# Patient Record
Sex: FEMALE | Race: WHITE | Employment: OTHER | URBAN - METROPOLITAN AREA
[De-identification: names, ages, dates, MRNs, and addresses within clinical notes are randomized per-mention and may not be internally consistent; named-entity substitution may affect disease eponyms.]

---

## 2019-05-19 ENCOUNTER — HOSPITAL ENCOUNTER (OUTPATIENT)
Facility: HOSPITAL | Age: 84
Setting detail: OBSERVATION
LOS: 1 days | Discharge: NURSING, REHAB OR LONG-TERM CARE | End: 2019-05-21
Attending: EMERGENCY MEDICINE | Admitting: INTERNAL MEDICINE
Payer: MEDICARE

## 2019-05-19 DIAGNOSIS — L03.116 CELLULITIS OF LEFT LOWER EXTREMITY: ICD-10-CM

## 2019-05-19 DIAGNOSIS — S82.402A TIBIA/FIBULA FRACTURE, LEFT, CLOSED, INITIAL ENCOUNTER: Primary | ICD-10-CM

## 2019-05-19 DIAGNOSIS — S82.202A TIBIA/FIBULA FRACTURE, LEFT, CLOSED, INITIAL ENCOUNTER: Primary | ICD-10-CM

## 2019-05-19 PROCEDURE — 96365 THER/PROPH/DIAG IV INF INIT: CPT

## 2019-05-19 PROCEDURE — 99285 EMERGENCY DEPT VISIT HI MDM: CPT

## 2019-05-19 PROCEDURE — 29515 APPLICATION SHORT LEG SPLINT: CPT

## 2019-05-19 RX ORDER — SENNA AND DOCUSATE SODIUM 50; 8.6 MG/1; MG/1
1 TABLET, FILM COATED ORAL 2 TIMES DAILY
COMMUNITY

## 2019-05-19 RX ORDER — MINERAL OIL AND PETROLATUM 150; 830 MG/G; MG/G
1 OINTMENT OPHTHALMIC AS NEEDED
COMMUNITY

## 2019-05-19 RX ORDER — ERYTHROMYCIN 5 MG/G
1 OINTMENT OPHTHALMIC 2 TIMES DAILY
COMMUNITY

## 2019-05-19 RX ORDER — OCTISALATE, AVOBENZONE, HOMOSALATE, AND OCTOCRYLENE 29.4; 29.4; 49; 25.48 MG/ML; MG/ML; MG/ML; MG/ML
4 LOTION TOPICAL
COMMUNITY
Start: 2019-05-17 | End: 2019-06-01

## 2019-05-20 ENCOUNTER — APPOINTMENT (OUTPATIENT)
Dept: GENERAL RADIOLOGY | Facility: HOSPITAL | Age: 84
End: 2019-05-20
Attending: EMERGENCY MEDICINE
Payer: MEDICARE

## 2019-05-20 ENCOUNTER — APPOINTMENT (OUTPATIENT)
Dept: ULTRASOUND IMAGING | Facility: HOSPITAL | Age: 84
End: 2019-05-20
Attending: EMERGENCY MEDICINE
Payer: MEDICARE

## 2019-05-20 PROBLEM — L03.116 CELLULITIS OF LEFT LOWER EXTREMITY: Status: ACTIVE | Noted: 2019-05-20

## 2019-05-20 PROBLEM — S82.202A TIBIA/FIBULA FRACTURE, LEFT, CLOSED, INITIAL ENCOUNTER: Status: ACTIVE | Noted: 2019-05-20

## 2019-05-20 PROBLEM — S82.402A TIBIA/FIBULA FRACTURE, LEFT, CLOSED, INITIAL ENCOUNTER: Status: ACTIVE | Noted: 2019-05-20

## 2019-05-20 PROCEDURE — 85610 PROTHROMBIN TIME: CPT | Performed by: EMERGENCY MEDICINE

## 2019-05-20 PROCEDURE — 96367 TX/PROPH/DG ADDL SEQ IV INF: CPT

## 2019-05-20 PROCEDURE — 84132 ASSAY OF SERUM POTASSIUM: CPT | Performed by: SPECIALIST

## 2019-05-20 PROCEDURE — 85730 THROMBOPLASTIN TIME PARTIAL: CPT | Performed by: EMERGENCY MEDICINE

## 2019-05-20 PROCEDURE — 96376 TX/PRO/DX INJ SAME DRUG ADON: CPT

## 2019-05-20 PROCEDURE — 73630 X-RAY EXAM OF FOOT: CPT | Performed by: EMERGENCY MEDICINE

## 2019-05-20 PROCEDURE — 85025 COMPLETE CBC W/AUTO DIFF WBC: CPT | Performed by: EMERGENCY MEDICINE

## 2019-05-20 PROCEDURE — 96366 THER/PROPH/DIAG IV INF ADDON: CPT

## 2019-05-20 PROCEDURE — 92610 EVALUATE SWALLOWING FUNCTION: CPT

## 2019-05-20 PROCEDURE — 80048 BASIC METABOLIC PNL TOTAL CA: CPT | Performed by: EMERGENCY MEDICINE

## 2019-05-20 PROCEDURE — 73590 X-RAY EXAM OF LOWER LEG: CPT | Performed by: EMERGENCY MEDICINE

## 2019-05-20 PROCEDURE — 93971 EXTREMITY STUDY: CPT | Performed by: EMERGENCY MEDICINE

## 2019-05-20 PROCEDURE — 96372 THER/PROPH/DIAG INJ SC/IM: CPT

## 2019-05-20 RX ORDER — AMLODIPINE BESYLATE 5 MG/1
5 TABLET ORAL NIGHTLY
Status: DISCONTINUED | OUTPATIENT
Start: 2019-05-20 | End: 2019-05-21

## 2019-05-20 RX ORDER — DONEPEZIL HYDROCHLORIDE 10 MG/1
10 TABLET, FILM COATED ORAL NIGHTLY
Status: DISCONTINUED | OUTPATIENT
Start: 2019-05-20 | End: 2019-05-21

## 2019-05-20 RX ORDER — SODIUM CHLORIDE 9 MG/ML
INJECTION, SOLUTION INTRAVENOUS CONTINUOUS
Status: DISCONTINUED | OUTPATIENT
Start: 2019-05-20 | End: 2019-05-20

## 2019-05-20 RX ORDER — ACETAMINOPHEN 325 MG/1
325 TABLET ORAL EVERY 6 HOURS PRN
Status: DISCONTINUED | OUTPATIENT
Start: 2019-05-20 | End: 2019-05-21

## 2019-05-20 RX ORDER — POLYETHYLENE GLYCOL 3350 17 G/17G
17 POWDER, FOR SOLUTION ORAL
Status: DISCONTINUED | OUTPATIENT
Start: 2019-05-20 | End: 2019-05-21

## 2019-05-20 RX ORDER — MIRTAZAPINE 15 MG/1
30 TABLET, FILM COATED ORAL NIGHTLY
Status: DISCONTINUED | OUTPATIENT
Start: 2019-05-20 | End: 2019-05-21

## 2019-05-20 RX ORDER — BISACODYL 10 MG
10 SUPPOSITORY, RECTAL RECTAL DAILY PRN
COMMUNITY

## 2019-05-20 RX ORDER — ERYTHROMYCIN 5 MG/G
1 OINTMENT OPHTHALMIC 2 TIMES DAILY
Status: DISCONTINUED | OUTPATIENT
Start: 2019-05-20 | End: 2019-05-21

## 2019-05-20 RX ORDER — POTASSIUM CHLORIDE 20 MEQ/1
40 TABLET, EXTENDED RELEASE ORAL EVERY 4 HOURS
Status: DISCONTINUED | OUTPATIENT
Start: 2019-05-20 | End: 2019-05-20

## 2019-05-20 RX ORDER — MINERAL OIL AND PETROLATUM 150; 830 MG/G; MG/G
1 OINTMENT OPHTHALMIC AS NEEDED
Status: DISCONTINUED | OUTPATIENT
Start: 2019-05-20 | End: 2019-05-21

## 2019-05-20 RX ORDER — SODIUM CHLORIDE 9 MG/ML
INJECTION, SOLUTION INTRAVENOUS CONTINUOUS
Status: DISCONTINUED | OUTPATIENT
Start: 2019-05-20 | End: 2019-05-21

## 2019-05-20 RX ORDER — GARLIC EXTRACT 500 MG
1 CAPSULE ORAL DAILY
Status: DISCONTINUED | OUTPATIENT
Start: 2019-05-20 | End: 2019-05-21

## 2019-05-20 RX ORDER — POTASSIUM CHLORIDE 20 MEQ/1
20 TABLET, EXTENDED RELEASE ORAL 2 TIMES DAILY
Status: DISCONTINUED | OUTPATIENT
Start: 2019-05-20 | End: 2019-05-21

## 2019-05-20 RX ORDER — SODIUM CHLORIDE 9 MG/ML
INJECTION, SOLUTION INTRAVENOUS CONTINUOUS
Status: ACTIVE | OUTPATIENT
Start: 2019-05-20 | End: 2019-05-20

## 2019-05-20 RX ORDER — LEVOTHYROXINE SODIUM 0.05 MG/1
50 TABLET ORAL
Status: DISCONTINUED | OUTPATIENT
Start: 2019-05-20 | End: 2019-05-21

## 2019-05-20 RX ORDER — SENNA AND DOCUSATE SODIUM 50; 8.6 MG/1; MG/1
1 TABLET, FILM COATED ORAL 2 TIMES DAILY
Status: DISCONTINUED | OUTPATIENT
Start: 2019-05-20 | End: 2019-05-21

## 2019-05-20 RX ORDER — ENOXAPARIN SODIUM 100 MG/ML
40 INJECTION SUBCUTANEOUS DAILY
Status: DISCONTINUED | OUTPATIENT
Start: 2019-05-20 | End: 2019-05-21

## 2019-05-20 RX ORDER — ATORVASTATIN CALCIUM 10 MG/1
10 TABLET, FILM COATED ORAL
Status: DISCONTINUED | OUTPATIENT
Start: 2019-05-20 | End: 2019-05-21

## 2019-05-20 RX ORDER — DOCUSATE SODIUM 100 MG/1
100 CAPSULE, LIQUID FILLED ORAL 2 TIMES DAILY PRN
Status: DISCONTINUED | OUTPATIENT
Start: 2019-05-20 | End: 2019-05-21

## 2019-05-20 RX ORDER — HYDROCODONE BITARTRATE AND ACETAMINOPHEN 5; 325 MG/1; MG/1
1 TABLET ORAL EVERY 6 HOURS PRN
Status: DISCONTINUED | OUTPATIENT
Start: 2019-05-20 | End: 2019-05-21

## 2019-05-20 RX ORDER — AMLODIPINE BESYLATE 2.5 MG/1
2.5 TABLET ORAL EVERY MORNING
Status: DISCONTINUED | OUTPATIENT
Start: 2019-05-20 | End: 2019-05-21

## 2019-05-20 RX ORDER — BISACODYL 10 MG
10 SUPPOSITORY, RECTAL RECTAL DAILY PRN
Status: DISCONTINUED | OUTPATIENT
Start: 2019-05-20 | End: 2019-05-21

## 2019-05-20 RX ORDER — MAGNESIUM OXIDE 400 MG (241.3 MG MAGNESIUM) TABLET
400 TABLET DAILY
Status: DISCONTINUED | OUTPATIENT
Start: 2019-05-20 | End: 2019-05-21

## 2019-05-20 RX ORDER — ASPIRIN 81 MG/1
81 TABLET, CHEWABLE ORAL DAILY
Status: DISCONTINUED | OUTPATIENT
Start: 2019-05-20 | End: 2019-05-21

## 2019-05-20 RX ORDER — MULTIPLE VITAMINS W/ MINERALS TAB 9MG-400MCG
1 TAB ORAL DAILY
Status: DISCONTINUED | OUTPATIENT
Start: 2019-05-20 | End: 2019-05-21

## 2019-05-20 NOTE — OCCUPATIONAL THERAPY NOTE
OCCUPATIONAL THERAPY                   OT evaluation order received. Patient is awaiting ortho consult for tib/fib fracture. Will hold OT eval until medical plan determined.

## 2019-05-20 NOTE — PLAN OF CARE
Received via stretcher from ER. Unable to discuss plan of care due to cognitive impairment. [facility] transfer form evaluated. Obtained all PTA medications from that form. Dr. Monserrat Sorenson notified of admission. New orders received.  IV infusing in right wri

## 2019-05-20 NOTE — PLAN OF CARE
Problem: Impaired Swallowing  Goal: Minimize aspiration risk  Description  Interventions:  - Patient should be alert and upright for all feedings (90 degrees preferred)  - Offer food and liquids at a slow rate  - No straws  - Encourage small bites of lucian

## 2019-05-20 NOTE — SLP NOTE
ADULT SWALLOWING EVALUATION    ASSESSMENT    ASSESSMENT/OVERALL IMPRESSION:  Patient with a history of a CVA in the past. Patient's daughter reports that patient had been recommended to be on Nectar thick liquids ~ 4 years ago.  Family chose to sign a waive Referral: RN dysphagia screen;R/O aspiration    Problem List  Principal Problem:    Tibia/fibula fracture, left, closed, initial encounter  Active Problems:    Cellulitis of left lower extremity      Past Medical History  Past Medical History:   Diagnosis you have any questions, please contact Wendall Sees

## 2019-05-20 NOTE — CONSULTS
[facility]    PATIENT'S NAME: Tiny Renner   ATTENDING PHYSICIAN: CHECO Berrios PHYSICIAN: Trey Posadas M.D.    PATIENT ACCOUNT#:   [de-identified]    LOCATION:  [address] [place] [address]  MEDICAL RECORD #:   [id number]       DATE OF BIRTH:  [birth year] SYSTEMS:  Negative. PHYSICAL EXAMINATION:    VITAL SIGNS:  The patient is only 5 feet 3 inches, she weighs 60 kg for a BMI of 23.55. Vital signs stable. Temperature 97.6, pulse 92, respiratory rate 22, blood pressure 134/66. HEENT:  Unremarkable.

## 2019-05-20 NOTE — PLAN OF CARE
Pt dtr poa requesting to speak to cleo sullivan, ortho md paged this am.  Per dtr, she spoke with ortho md.  Pt A&Ox2-3, hx of cognitive issues resulting from hx cva. On ra,  & scds in place. Tolerating diet. Speech seen pt. voiding in brief. bm today.  Pt ab

## 2019-05-20 NOTE — ED NOTES
Bed assignment received - call to [facility] /floor = to call us back for report. Pt remains resting comfortably.

## 2019-05-20 NOTE — PHYSICAL THERAPY NOTE
Patient is awaiting Ortho consult for management of Left Tibia - Fibula Fx. Will follow up as appropriate after Ortho consult & further recommendations for mobility. RN April Nettles was notified.

## 2019-05-20 NOTE — ED INITIAL ASSESSMENT (HPI)
PT presents with LLE redness and swelling. Xray completed, shows fracture to Tib/Fib. Staff at [facility] denies trauma.
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
## 2019-05-20 NOTE — ED NOTES
Called [facility] to file report. Intake personnel were busy, this RN gave her name and number to follow up on.

## 2019-05-20 NOTE — ED PROVIDER NOTES
Patient Seen in: [facility] Emergency Department    History   Patient presents with:  Lower Extremity Injury (musculoskeletal)    Stated Complaint:     HPI    80-year-old female presents emergency room for evaluation of left lower extremity redness 134/66   Pulse 92   Temp 97.6 °F (36.4 °C) (Temporal)   Resp 22   Ht 160 cm (5' 3\")   Wt 60.3 kg   SpO2 92%   Breastfeeding? No   BMI 23.55 kg/m²         Physical Exam    GENERAL: Patient is awake, alert,  in no acute distress. HEENT: no scleral icterus. order CBC WITH DIFFERENTIAL WITH PLATELET.   Procedure                               Abnormality         Status                     ---------                               -----------         ------                     CBC W/ DIFFERENTIAL[[id number]] diaphysis. No dislocation. Marked diffuse osteopenia. Chronic chondrocalcinosis of the knee. Noted degree of chronic degenerative joint space loss involving the foot. Prominent arterial atherosclerotic calcification.       The patient's splint was

## 2019-05-20 NOTE — H&P
[facility] Patient Status:  Inpatient    1934 MRN [id number]   [facility] 3SW-A Attending Yariel Murillo MD   Hosp Day # 0 PCP Angelo Hickman MD     Date:  2019  Date of Admission:   quit smoking. She has a 20.00 pack-year smoking history. She has never used smokeless tobacco. She reports that she does not drink alcohol or use drugs.     Allergies:    Bactrim [Sulfametho*      Codeine                     Comment:Congestion  Diovan [Vals mouth nightly. Disp:  Rfl:  5/19/2019 at Unknown time   Levothyroxine Sodium 50 MCG Oral Tab Take 50 mcg by mouth before breakfast. Disp:  Rfl:  5/19/2019 at Unknown time   Atorvastatin Calcium (LIPITOR) 10 MG Oral Tab Take 10 mg by mouth every other day. sleepy- but woke up on verbal commands. Not able to give good history  Head: Normocephalic, without obvious abnormality, atraumatic  Eyes: pallor + left eye- conjunctiva congested - ? Cornea cloudy- unable to examine properly as pt wanted to sleep.    Neck: LLE IMMOBILIZATION  REPLACE K  CHECK CBC BMP IN AM  PT/OT/ST CONSULT  MECH SOFT DIET WITH NTL- PRE ST.   ORTHO CONSULT DR. Carmen Kennedy  DNR  PAGED NURSE TO DISCUSS.    WILL SIGN OUT TO DR. Darian Hair AM.     Pernell Minor ( covering dr. Paula Flor)  5/20/2019  12

## 2019-05-21 VITALS
HEIGHT: 63 IN | BODY MASS INDEX: 23.55 KG/M2 | DIASTOLIC BLOOD PRESSURE: 49 MMHG | OXYGEN SATURATION: 99 % | TEMPERATURE: 98 F | WEIGHT: 132.94 LBS | SYSTOLIC BLOOD PRESSURE: 116 MMHG | RESPIRATION RATE: 16 BRPM | HEART RATE: 64 BPM

## 2019-05-21 PROCEDURE — 96372 THER/PROPH/DIAG INJ SC/IM: CPT

## 2019-05-21 PROCEDURE — 96376 TX/PRO/DX INJ SAME DRUG ADON: CPT

## 2019-05-21 PROCEDURE — 96366 THER/PROPH/DIAG IV INF ADDON: CPT

## 2019-05-21 PROCEDURE — 97161 PT EVAL LOW COMPLEX 20 MIN: CPT

## 2019-05-21 PROCEDURE — 80048 BASIC METABOLIC PNL TOTAL CA: CPT | Performed by: INTERNAL MEDICINE

## 2019-05-21 PROCEDURE — 97165 OT EVAL LOW COMPLEX 30 MIN: CPT

## 2019-05-21 PROCEDURE — 85027 COMPLETE CBC AUTOMATED: CPT | Performed by: INTERNAL MEDICINE

## 2019-05-21 RX ORDER — TRAMADOL HYDROCHLORIDE 50 MG/1
50 TABLET ORAL EVERY 4 HOURS PRN
Qty: 30 TABLET | Refills: 0 | Status: SHIPPED | OUTPATIENT
Start: 2019-05-21

## 2019-05-21 RX ORDER — HYDROCODONE BITARTRATE AND ACETAMINOPHEN 5; 325 MG/1; MG/1
1 TABLET ORAL EVERY 6 HOURS PRN
Qty: 40 TABLET | Refills: 0 | Status: SHIPPED | OUTPATIENT
Start: 2019-05-21 | End: 2019-05-21

## 2019-05-21 RX ORDER — ENOXAPARIN SODIUM 100 MG/ML
40 INJECTION SUBCUTANEOUS DAILY
Qty: 14 SYRINGE | Refills: 0 | Status: SHIPPED | OUTPATIENT
Start: 2019-05-22 | End: 2019-06-02

## 2019-05-21 NOTE — OCCUPATIONAL THERAPY NOTE
OCCUPATIONAL THERAPY QUICK EVALUATION - INPATIENT    Room Number: 384/384-A  Evaluation Date: 5/21/2019     Type of Evaluation: Quick Eval  Presenting Problem: L tib/fib fracture    Physician Order: IP Consult to Occupational Therapy  Reason for Therapy: SUBJECTIVE  \"No, don't touch me! \"  \"I want to go home! \"    OBJECTIVE  Precautions: Aspiration(W/c bound from nursing facility)  Fall Risk: High fall risk    WEIGHT BEARING RESTRICTION  Weight Bearing Restriction: L lower extremity           L Lower precautions, patient with no sign of understanding, will require ongoing reinforcement/assist; attempted to engage patient in ROM/MMT, patient with fair return demo, requiring frequent redirection to task and becoming agitated at times with encouragement t Tj)  OT Device Recommendations: None    PLAN   Patient has been evaluated and presents with no skilled Occupational Therapy needs at this time. Patient discharged from Occupational Therapy services.   Please re-order if a new functional limitation prese

## 2019-05-21 NOTE — CM/SW NOTE
Per RN Robert Barrios, pt is cleared for discharge back to [facility] today. Spoke with Qian Albert at [facility] earlier today and she requested pt come after 3pm.     Requested BLS transport with [facility] at 430pm today. [phone].  Faxed PCS form to [facility]

## 2019-05-21 NOTE — CM/SW NOTE
Kirit Escamilla, 05/21/19, 12:03 PM  Patient failed inpatient criteria. Second level of review completed and supports observation. UR committee in agreement. Discussed with Dr. Ade Juárez and Abby Marks who approves observation status.  Observation letter given to

## 2019-05-21 NOTE — PLAN OF CARE
Pt. Admitted for L distal tib/fib fx on 05-20-19, splinted in Er, non-surgical per Ortho. Is wheelchair bound at home. A & O x 3-4 but forgetful and confused at times. Pain level is well controlled with Tylenol. VS stable, refusing  and O2 via Nc.   In

## 2019-05-21 NOTE — PROGRESS NOTES
05/21/19 1451   Clinical Encounter Type   Referral To Nurse;Physician  ( scanned signed POLSt form, from [facility] dated 2/19/2015, into patient's electronic resuscitation wishes/POLST form. )

## 2019-05-21 NOTE — PLAN OF CARE
Pt A&O. Forgetful at times. She is at her baseline, per her daughter. On room air. Refusing scds to BLE. Left leg with post mold and ace wrap drsg C/D/I. Elevated on pillows. Oral tylenol for pain. Tolerating soft/chopped diet with fair appetite.  Needs ass

## 2019-05-21 NOTE — PHYSICAL THERAPY NOTE
PHYSICAL THERAPY QUICK EVALUATION - INPATIENT    Room Number: 384/384-A  Evaluation Date: 5/21/2019  Presenting Problem: Left Distal Tibia-Fibula Fx - Non surgical   Physician Order: PT Eval and Treat  History re: current admission : Patient was admitted facility)  Fall Risk: High fall risk    WEIGHT BEARING RESTRICTION  Weight Bearing Restriction: L lower extremity           L Lower Extremity: Non-Weight Bearing    PAIN ASSESSMENT  Rating: Unable to rate  Location: Left LE when attempt to move  Management transfers. Skilled Therapy Provided: Per SAHRA Marie : OK to see patient for PT evaluation. Patient was received in bed, Attempted to educate patient re: NWB status on left LE without any success.  Patient required total assist to roll in bed & transfer patient is nonambulator

## 2019-05-21 NOTE — PROGRESS NOTES
05/21/19 0540   Clinical Encounter Type   Visited With Family; Health care provider   Referral To Nurse  ( spoke with SANNA, daughter Kaye Fam over the phone.  Kaye Fam stated that [facility] has all of her mother's health care directives.  )   Family Sp

## 2019-05-22 NOTE — CM/SW NOTE
05/22/19 0800   Discharge disposition   Expected discharge disposition Skilled Nurs   Name of Facillity/Home Care/Hospice [facility]   Discharge transportation [facility] 5/21/19

## 2019-05-22 NOTE — PAYOR COMM NOTE
[facility]  OBSERVATION    PLACE IN OBSERVATION (Order #[id number]) on 5/21/19  --------------  ADMISSION REVIEW     Payor: [address] [place], [place] [address] #:  [id number]  Authorization Number: [id number]

## (undated) NOTE — IP AVS SNAPSHOT
Patient Demographics     Address  [address] [address]  [place] [address] Phone  [phone] [facility])  [phone] [facility]      Emergency Contact(s)     Name Relation Home Work [place] Daughter [phone]      Allergies as amLODIPine Besylate 2.5 MG Tabs  Commonly known as:  NORVASC  Next dose due: Tomorrow morning      Take 2.5 mg by mouth every morning. amLODIPine Besylate 5 MG Tabs  Commonly known as:  NORVASC  Next dose due:   Tonight at bedtime      Jerry Goins Take 50 mcg by mouth before breakfast.          magnesium oxide 400 MG Tabs  Commonly known as:  MAG-OX  Next dose due: Tomorrow morning      Take 400 mg by mouth daily. MIRALAX OR      Take 17 g by mouth daily as needed.           mirtazapine 30 05/21/19 0928 Given      [id number] acetaminophen (TYLENOL) tab 325 mg 05/21/19 0223 Given      [id number] acetaminophen (TYLENOL) tab 325 mg 05/21/19 0929 Given      [id number] acetaminophen (TYLENOL) tab 325 mg 05/21/19 1602 Given      [id number] amLODIPine BASIC METABOLIC PANEL (8) [[id number]] (Abnormal)  Resulted: 05/21/19 0520, Result status: Final result   Ordering provider:  Mesha Jeffery MD  05/20/19 2300 Resulting lab:  [facility]    Specimen Information    Type Source Collected On   Blood — 05/ H&P - H&P Note      H&P signed by Brooklyn Zelaya MD at 5/20/2019  6:05 PM  Version 1 of 1    Author:  Brooklyn Zelaya MD Service:  Internal Medicine Author Type:  Physician    Filed:  5/20/2019  6:05 PM Date of Service:  5/20/2019 12:42 PM • HIP REPLACEMENT SURGERY     • KYPHOPLASTY N/A 12/17/2014    Performed by Essence Andre MD at [address]   • REMOVAL OF TONSILS,12+ Y/O     • SKIN SURGERY  07/24/2018    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 Disp:  Rfl:  5/19/2019 at Unknown time   magnesium oxide 400 MG Oral Tab Take 400 mg by mouth daily.  Disp:  Rfl:  5/19/2019 at Unknown time   magnesium hydroxide ([employer] MILK OF MAGNESIA) 400 MG/5ML Oral Suspension Take by mouth daily as needed f ibuprofen (MOTRIN) 600 MG Oral Tab Take 600 mg by mouth daily.  Disp:  Rfl:  Not Taking       Review of Systems:[SP.1]  Pertinent items are noted in HPI.[SP.2]    Physical Exam:[SP.1]  /63 (BP Location: Right arm)   Pulse 79   Temp 98.5 °F (36.9 °C) ( Unspecified essential hypertension     Pneumonia     SOB (shortness of breath)     Compression fracture of L5 lumbar vertebra     S/P kyphoplasty     Tibia/fibula fracture, left, closed, initial encounter     Cellulitis of left lower extremity    Plan:[ caught in a shoe. She reports her leg twisted, and she noted pain and discomfort. She was brought to [facility] Emergency Room and an x-ray was obtained.   She was noted to have a spiral fracture of the distal tibia with mild displacement, with an ip PLAN:  Nonoperative treatment since the patient is a non-ambulator and does not stand. All of the patient's questions were answered to her satisfaction.      Dictated By Trey Posadas M.D.  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 Pertinent comorbidities and personal factors impacting therapy include HTN,HL,Anxiety,H/o Cancer - baseline - wheel chair bound @ SNF. Functional outcome measures completed include AM PAC scores.   Based Occupational Therapy Notes (last 72 hours) (Notes from 5/18/2019  4:46 PM through 5/21/2019  4:46 PM)      Occupational Therapy Note signed by Gutierrez Jacob OT at 5/21/2019  4:09 PM  Version 2 of 2    Author:  Gutierrez Jacob OT Service:  Rehab A Type of Home: [address] [place] [address] [place]: One level  Lives With: Staff 24 hours    Toilet and Equipment: Standard height toilet  Shower/Tub and Equipment: Walk-in shower  Other Equipment: None          Drives: No  Patient Regularly Uses: Glasses -   Bathing (including washing, rinsing, drying)?: Total  -   Toileting, which includes using toilet, bedpan or urinal? : Total  -   Putting on and taking off regular upper body clothing?: Total  -   Taking care of personal grooming such as brushing teeth? functional transfers; ankit lift transfers at baseline. No further OT services needed at this time. Recommend return to SNF.     Patient Complexity  Occupational Profile/Medical History  LOW - Brief history including review of medical or therapy records OCCUPATIONAL THERAPY QUICK EVALUATION - INPATIENT    Room Number: 384/384-A  Evaluation Date: 5/21/2019     Type of Evaluation: Quick Eval  Presenting Problem: L tib/fib fracture    Physician Order: IP Consult to Occupational Therapy  Reason for Therapy: SUBJECTIVE  \"No, don't touch me! \"  \"I want to go home! \"    OBJECTIVE  Precautions: Aspiration(W/c bound from nursing facility)  Fall Risk: High fall risk    WEIGHT BEARING RESTRICTION  Weight Bearing Restriction: L lower extremity           L Lower Ext Education on NWB precautions, patient with no sign of understanding, will require ongoing reinforcement/assist; attempted to engage patient in ROM/MMT, patient with fair return demo, requiring frequent redirection to task and becoming agitated at times wit Therapy needs at this time. Patient discharged from Occupational Therapy services. Please re-order if a new functional limitation presents during this admission.     Patient was able to achieve the following goals:  Patient able to toilet transfer: unable ago. Family chose to sign a waiver at the SNF and have their mother drink thin liquids. Patient has been tolerating thin liquids. Patient is on a regular diet with thin liquids at the SNF. Patient admitted with fracture to tibia-fibula.   Contacted afshan Cellulitis of left lower extremity[CJ.3]      Past Medical History[CJ.1]  Past Medical History:   Diagnosis Date   • Anxiety    • Cancer ([facility] [address]     skin   • Chest pain    • Detached retina    • Other abnormal glucose prediabetic   • Other and unspecified If you have any questions, please contact Chelsie DANIELS[CJ.1]janeth[CJ.2]    Electronically signed by LIONEL Matthews on 5/20/2019  1:12 PM   Attribution Key    CJ.1 - LIONEL Matthews on 5/20/2019 11:32 AM  CJ.2 - LIONEL Matthews on 5/20/20